# Patient Record
Sex: MALE | Race: BLACK OR AFRICAN AMERICAN
[De-identification: names, ages, dates, MRNs, and addresses within clinical notes are randomized per-mention and may not be internally consistent; named-entity substitution may affect disease eponyms.]

---

## 2019-02-14 ENCOUNTER — HOSPITAL ENCOUNTER (OUTPATIENT)
Dept: HOSPITAL 59 - HOP | Age: 54
Discharge: HOME | End: 2019-02-14
Attending: INTERNAL MEDICINE
Payer: COMMERCIAL

## 2019-02-14 DIAGNOSIS — Z12.11: Primary | ICD-10-CM

## 2019-02-14 DIAGNOSIS — K62.1: ICD-10-CM

## 2019-02-14 DIAGNOSIS — I10: ICD-10-CM

## 2019-02-14 DIAGNOSIS — Z86.010: ICD-10-CM

## 2019-02-15 NOTE — OPERATIVE NOTE
DATE OF SURGERY: 02/14/2019



OPERATION: COLONOSCOPY with cold forceps polypectomy x2. 



PREOPERATIVE DIAGNOSIS: Personal history of colon polyps. 



POSTOPERATIVE DIAGNOSIS: Rectal polyps x2, status post cold forceps removal. Otherwise normal exam. 



PREPARATION QUALITY: Excellent. 



ESTIMATED BLOOD LOSS: Minimal. 



SPECIMENS: Rectal polyps. 



COMPLICATIONS: None apparent. 



PROCEDURE: After informed consent was obtained from the patient, he was placed in the left lateral 
decubitus position in the endoscopy suite, sedated and monitored by the department of anesthesia. 
Digital rectal exam was unremarkable. A well-lubricated [id number] colonoscope was inserted into the 
rectum and advanced to the cecum. Preparation quality was excellent. The cecum, cecal bulb, 
ileocecal valve, appendiceal orifice, ascending colon, transverse colon, descending colon, and 
sigmoid colon were free of inflammatory changes, mass lesions, or polyps. Forward and J-turn views 
of the rectum and anorectum were unremarkable. The endoscope was straightened. There were 2 
diminutive polyps identified each removed with a cold forceps. The rectal ampulla deflated, and the 
endoscope was removed. 



RECOMMENDATIONS: The patient should resume his medications and diet. I would recommend a repeat exam 
in 5 years pending tissue histology. 



As always, thank you for allowing me to participate in the healthcare of your patients. CC: DO MICKEY Orr